# Patient Record
Sex: MALE | Employment: FULL TIME | URBAN - METROPOLITAN AREA
[De-identification: names, ages, dates, MRNs, and addresses within clinical notes are randomized per-mention and may not be internally consistent; named-entity substitution may affect disease eponyms.]

---

## 2022-04-09 ENCOUNTER — HOSPITAL ENCOUNTER (EMERGENCY)
Facility: CLINIC | Age: 27
Discharge: HOME | End: 2022-04-09
Admitting: PHYSICIAN ASSISTANT
Payer: COMMERCIAL

## 2022-04-09 VITALS
RESPIRATION RATE: 16 BRPM | TEMPERATURE: 99.9 F | BODY MASS INDEX: 24.44 KG/M2 | DIASTOLIC BLOOD PRESSURE: 64 MMHG | OXYGEN SATURATION: 97 % | HEIGHT: 69 IN | SYSTOLIC BLOOD PRESSURE: 105 MMHG | WEIGHT: 165 LBS | HEART RATE: 90 BPM

## 2022-04-09 DIAGNOSIS — Z20.822 LAB TEST NEGATIVE FOR COVID-19 VIRUS: ICD-10-CM

## 2022-04-09 DIAGNOSIS — J02.8 SORE THROAT (VIRAL): ICD-10-CM

## 2022-04-09 DIAGNOSIS — B97.89 SORE THROAT (VIRAL): ICD-10-CM

## 2022-04-09 DIAGNOSIS — J02.9 VIRAL PHARYNGITIS: ICD-10-CM

## 2022-04-09 LAB
DEPRECATED S PYO AG THROAT QL EIA: NEGATIVE
FLUAV RNA SPEC QL NAA+PROBE: NEGATIVE
FLUBV RNA RESP QL NAA+PROBE: NEGATIVE
GROUP A STREP BY PCR: NOT DETECTED
RSV RNA SPEC NAA+PROBE: NEGATIVE
SARS-COV-2 RNA RESP QL NAA+PROBE: NEGATIVE

## 2022-04-09 PROCEDURE — 99282 EMERGENCY DEPT VISIT SF MDM: CPT | Performed by: PHYSICIAN ASSISTANT

## 2022-04-09 PROCEDURE — C9803 HOPD COVID-19 SPEC COLLECT: HCPCS

## 2022-04-09 PROCEDURE — 250N000013 HC RX MED GY IP 250 OP 250 PS 637: Performed by: PHYSICIAN ASSISTANT

## 2022-04-09 PROCEDURE — 87651 STREP A DNA AMP PROBE: CPT | Performed by: PHYSICIAN ASSISTANT

## 2022-04-09 PROCEDURE — 99283 EMERGENCY DEPT VISIT LOW MDM: CPT

## 2022-04-09 PROCEDURE — 87637 SARSCOV2&INF A&B&RSV AMP PRB: CPT | Performed by: PHYSICIAN ASSISTANT

## 2022-04-09 RX ORDER — DIPHENHYDRAMINE HYDROCHLORIDE AND LIDOCAINE HYDROCHLORIDE AND ALUMINUM HYDROXIDE AND MAGNESIUM HYDRO
5-10 KIT EVERY 6 HOURS PRN
Qty: 119 ML | Refills: 0 | Status: SHIPPED | OUTPATIENT
Start: 2022-04-09

## 2022-04-09 RX ORDER — ACETAMINOPHEN 500 MG
1000 TABLET ORAL ONCE
Status: COMPLETED | OUTPATIENT
Start: 2022-04-09 | End: 2022-04-09

## 2022-04-09 RX ADMIN — ACETAMINOPHEN 1000 MG: 500 TABLET ORAL at 12:23

## 2022-04-09 NOTE — ED TRIAGE NOTES
Pt presents with c/o fever, sore throat and body aches x 3 days.  Denies recent sick contacts.  +Difficulty swallowing.

## 2022-04-09 NOTE — DISCHARGE INSTRUCTIONS
Here in the emergency department, you have reassuring vital signs.  On examination, he have some redness in your throat, consistent with a throat infection.  Your strep test returned negative, Covid influenza and RSV testing all returned negative.  Your symptoms are consistent with another virus causing difficulties.  You did have improvement of your symptoms with Tylenol, we discussed use of this and ibuprofen help with your throat discomfort, Tylenol 1000 mg 3 times daily ibuprofen 600 mg 4 times daily.  Prescription for Magic mouthwash was given to you today, this can be filled at any pharmacy, can be used as needed for throat discomfort.  Also recommend cold food, soft food, pushing fluids while you are feeling ill.  Your symptoms should be improving within the next several days.  We discussed reasons to return sooner including if you develop worsening pain, problems with swallowing or breathing, or high fevers greater than 104.  With you having an illness, we discussed importance of staying home from work until you have improvement of symptoms for 24 hours.  Work note given.  Patient has no other questions or concerns at this time.  Red flag signs were addressed, and they were in agreement with the patient care plan provided.

## 2022-04-09 NOTE — ED PROVIDER NOTES
ED Provider Note  [facility]      History   No chief complaint on file.    HPI  Alia Su is a 26 year old male who presents to the emergency department today with concerns for fever, sore throat, nasal congestion.  He presents alone.  He states that 2 days ago he started experiencing fever with T-max 101.5 at home.  He also notes new onset sore throat, and nasal congestion.  He reports associated body aches and chills as well since symptom onset.  Denies any cough.  He denies any shortness of breath.  He states he does have some chronic chest pain that he attributes to weight lifting, unchanged from his baseline.  He reports some mild nausea, without any emesis diarrhea or abdominal pain.  No urinary symptoms.    Patient denies any known sick contacts.  He is unvaccinated against COVID-19.  He states he has had COVID-19 on 2 other occasions, last diagnosis fall 2020.  He denies any history of asthma, he does not smoke.  Denies any chronic medical problems.  He has not taken any medication thus far for symptom relief.  Is able to eat and drink, tell me that he has had decreased intake however due to his throat pain.  He denies any drooling.    Past Medical History  History reviewed. No pertinent past medical history.  No past surgical history on file.  magic mouthwash suspension, diphenhydrAMINE, lidocaine, aluminum-magnesium & simethicone, (FIRST-MOUTHWASH BLM) compounding kit      No Known Allergies  Family History  No family history on file.  Social History   Social History     Tobacco Use     Smoking status: None     Smokeless tobacco: None   Substance Use Topics     Alcohol use: None     Drug use: None      Past medical history, past surgical history, medications, allergies, family history, and social history were reviewed with the patient. No additional pertinent items.       Review of Systems  A complete review of systems was performed with pertinent positives and  "negatives noted in the HPI, and all other systems negative.    Physical Exam   BP: 105/64  Pulse: 90  Temp: 99.9  F (37.7  C)  Resp: 16  Height: 175.3 cm (5' 9\")  Weight: 74.8 kg (165 lb)  SpO2: 97 %  Physical Exam  GENERAL APPEARANCE: The patient is well developed, well appearing, and in no acute distress.  HEAD:  Normocephalic and atraumatic.   EENT: No scleral icterus.  No conjunctival injection is noted.  Tympanic membranes without erythema or bulging.  Nares without erythema or lesions. Oropharynx is clear, with mild erythema or exudates.  Uvula midline.  No drooling or hot potato voice.  NECK: Trachea is midline.patient reports bilateral anterior cervical adenopathy.  No palpable nodes.  No swelling noted.  LUNGS: Breath sounds are equal and clear bilaterally. No wheezes, rhonchi, or rales.  HEART: Regular rate and normal rhythm.  Radial pulses 2+ bilaterally.  ABDOMEN: Soft, flat, and benign. No mass, tenderness, guarding, or rebound.Bowel sounds are present.  EXTREMITIES: No cyanosis, clubbing, or edema.  NEUROLOGIC: No focal sensory or motor deficits are noted.  PSYCHIATRIC: The patient is awake, alert.  Appropriate mood and affect.  SKIN: Warm, dry, and well perfused. Good turgor.    ED Course        Results for orders placed or performed during the hospital encounter of 04/09/22   Symptomatic; Yes; 4/7/2022 Influenza A/B & SARS-CoV2 (COVID-19) Virus PCR Multiplex Nose     Status: Normal    Specimen: Nose; Swab   Result Value Ref Range    Influenza A PCR Negative Negative    Influenza B PCR Negative Negative    RSV PCR Negative Negative    SARS CoV2 PCR Negative Negative, Testing sent to reference lab. Results will be returned via unsolicited result    Narrative    Testing was performed using the Xpert Xpress CoV2/Flu/RSV Assay on the Cepheid GeneXpert Instrument. This test should be ordered for the detection of SARS-CoV-2 and influenza viruses in individuals who meet clinical and/or epidemiological " criteria. Test performance is unknown in asymptomatic patients. This test is for in vitro diagnostic use under the FDA EUA for laboratories certified under CLIA to perform high or moderate complexity testing. This test has not been FDA cleared or approved. A negative result does not rule out the presence of PCR inhibitors in the specimen or target RNA in concentration below the limit of detection for the assay. If only one viral target is positive but coinfection with multiple targets is suspected, the sample should be re-tested with another FDA cleared, approved, or authorized test, if coinfection would change clinical management. This test was validated by the [facility]. These laboratories are certified under the Clinical  Laboratory Improvement Amendments of 1988 (CLIA-88) as qualified to perform high complexity laboratory testing.   Streptococcus A Rapid Scr w Reflx to PCR     Status: Normal    Specimen: Throat; Swab   Result Value Ref Range    Group A Strep antigen Negative Negative     Medications   acetaminophen (TYLENOL) tablet 1,000 mg (1,000 mg Oral Given 4/9/22 1223)        Assessments & Plan (with Medical Decision Making)   At this time, discussed with patient findings today from history and physical exam.  This is a 26-year-old male, without chronic medical problems, presents to the emergency department today with concerns for sore throat, nasal congestion and fever.  He has reassuring exam findings today with some mild erythema in the throat, without uvular deviation, unilateral tonsillar swelling, drooling, or other findings for PTA.  Initial vitals afebrile, although temperature elevated 99.9.  He is otherwise hypoxic without any tachycardia.  He is denying any shortness of breath or chest pain at this time, or cough.  I discussed with him recommendations for Covid testing, strep testing, as well as acetaminophen as he has not taken any antipyretics today thus far.  Differential  includes viral pharyngitis, Covid illness, strep pharyngitis, other reviewed viral etiology for symptoms.  He was given 1000 Tylenol here in the emergency department.    Rapid strep returned negative.  Covid, influenza, RSV testing returns negative.  I discussed these findings with the patient.  Symptoms consistent with viral etiology.  Did recommend use of Tylenol to be Profen as needed as he has not taken any antipyretics at home.  He was given prescription for Magic mouthwash to be used as needed for throat discomfort.  We discussed importance of pushing fluids.  Work note given I did discuss with him about staying home until improvement of symptoms for 24 hours.  We discussed findings for worsening infection for which he would need to return.  Patient has no other questions or concerns at this time.  Red flag signs were addressed, and they were in agreement with the patient care plan provided.    I have reviewed the nursing notes. I have reviewed the findings, diagnosis, plan and need for follow up with the patient.    Discharge Medication List as of 4/9/2022  2:08 PM      START taking these medications    Details   magic mouthwash suspension, diphenhydrAMINE, lidocaine, aluminum-magnesium & simethicone, (FIRST-MOUTHWASH BLM) compounding kit Take 5-10 mLs by mouth every 6 hours as needed for sore throat Gargle and spit, Disp-119 mL, R-0, Local Print             Final diagnoses:   Viral pharyngitis   Lab test negative for COVID-19 virus       --    [facility] EMERGENCY DEPARTMENT  4/9/2022     Eileen Evans, DORI  04/09/22 1932

## 2022-04-09 NOTE — Clinical Note
Alia Su was seen and treated in our emergency department on 4/9/2022.  He may return to work on 04/11/2022.  Patient needs to isolate at home until improvement of symptoms for 24 hours prior to returning to work.     If you have any questions or concerns, please don't hesitate to call.      Eileen Evans, DORI